# Patient Record
Sex: FEMALE | Race: WHITE | URBAN - METROPOLITAN AREA
[De-identification: names, ages, dates, MRNs, and addresses within clinical notes are randomized per-mention and may not be internally consistent; named-entity substitution may affect disease eponyms.]

---

## 2018-10-09 ENCOUNTER — TELEPHONE (OUTPATIENT)
Dept: FAMILY MEDICINE CLINIC | Age: 28
End: 2018-10-09

## 2018-10-09 NOTE — TELEPHONE ENCOUNTER
The patient called asking for immunization records as [place] and she needs proof of immunizations for Hep B, MMR, and if she needs an Adacel. They will do titers if she is not immunized. Received paper chart. She received all her MMR series, didn't finish last Hep B in the series that we show, and she needs an updated Adacel if she hasnt had it down elsewhere. Asked her to give me a call regarding all this and a fax number so I can fax all to her. I updated immunziations in epic and will scan in the copies of immunizations from the paper chart.